# Patient Record
(demographics unavailable — no encounter records)

---

## 2025-03-25 NOTE — DATA REVIEWED
[FreeTextEntry1] : Laboratories (February 26, 2025) reviewed and significant for:  Calcium 9.8 mg/dL (albumin 4.5 g/dL) PTH 54 pg/mL 25-hydroxyvitamin D 48 ng/mL BUN/creatinine 22/0.94 mg/dL (eGFR 67 mL/min) Alkaline phosphatase 81 U/L Phosphorus 2.7 mg/dL Magnesium 2.1 mg/dL  Most recent bone mineral density Date: July 18, 2024 Source: Hologic Site: [facility]  Site	BMD	T-score	Change previous	Change baseline	 Lumbar spine	0.733	-2.9		+0.7%	 Femoral neck	0.500	-3.1		-1.1%	 Total hip           0.592	-2.9		-3.7%	 Distal radius	0.457	-4.0		+0.1%	 DXA comments: *Significant change from previous; left hip values

## 2025-03-25 NOTE — HISTORY OF PRESENT ILLNESS
[FreeTextEntry1] : Ms. Tamayo is a 67 year-old woman with a history of osteoporosis presenting for follow-up. I saw her for an initial visit in December 2019 and last in September 2024.  Bone History Menopause: Around age 53 Osteoporosis diagnosed in 2015 on routine bone density testing significant for T-scores of -3.6 at the lumbar spine, -3.3 at the total hip; most recent bone density test as below Fracture history: None Family history: Mother with history of multiple fractures including hip fracture Treatment:  Delayed release risedronate 35 mg weekly for a few doses in 2015, discontinued due to fevers, chills, muscle aches, nausea/vomiting Raloxifene 60 mg daily from 2015 to 2017 Teriparatide 20 mcg daily from 2017 to November 2019 Denosumab 60 mg SC in January 2020, July 2020, January 2021, August 2021, February 2022, August 2022, March 2023, September 2023, March 2024, September 2024, March 2025 Other: Prior metabolic evaluation for secondary causes of bone loss negative per her report. She was noted to have hypercalcemia during therapy with teriparatide that initially persisted; serum calcium has subsequently been within the normal range with hydration.   Falls: No Height loss: No Kidney stones: No; renal ultrasound negative for nephrolithiasis in November 2020 Dental health: Regular appointments, no history of implants, no upcoming procedures planned  Exercise: Active but no formal regimen Dairy intake: 1-2 servings daily (glass of milk daily, cheese a few times per week) Calcium supplements: Tums 300 mg - take up to 4 per day due to gastroesophageal reflux Multivitamin: None Vitamin D supplements: 2000 intl units daily  Osteoporosis risk factors include: Postmenopausal status,  race, prior fracture, falls, height loss, small thin bones, tobacco use, excessive alcohol, anorexia, family history, vitamin D deficiency, corticosteroid use, seizure medications, malabsorption, hyperparathyroidism, hyperthyroidism. NEGATIVE EXCEPT: Postmenopausal status,  race, small thin bones, family history  Interim History She has received denosumab from January 2020 to present. Recent laboratory results as below. Serum calcium, PTH, 25-hydroxyvitamin D, and renal function within the normal ranges. She feels well today. No acute issues. Her son was  in October. Medical and surgical history, medications, allergies, social and family history reviewed and updated as needed.

## 2025-03-25 NOTE — PHYSICAL EXAM
[Alert] : alert [Healthy Appearance] : healthy appearance [No Acute Distress] : no acute distress [Normal Sclera/Conjunctiva] : normal sclera/conjunctiva [Normal Hearing] : hearing was normal [No Respiratory Distress] : no respiratory distress [No Stigmata of Cushings Syndrome] : no stigmata of Cushings Syndrome [Normal Gait] : normal gait [Normal Insight/Judgement] : insight and judgment were intact [Kyphosis] : no kyphosis present [Acanthosis Nigricans] : no acanthosis nigricans [de-identified] : no moon facies, no supraclavicular fat pads